# Patient Record
Sex: MALE | Race: OTHER | NOT HISPANIC OR LATINO | Employment: OTHER | URBAN - METROPOLITAN AREA
[De-identification: names, ages, dates, MRNs, and addresses within clinical notes are randomized per-mention and may not be internally consistent; named-entity substitution may affect disease eponyms.]

---

## 2023-07-19 LAB
ALANINE AMINOTRANSFERASE (SGPT) (U/L) IN SER/PLAS: 10 U/L (ref 10–52)
ALBUMIN (G/DL) IN SER/PLAS: 4.5 G/DL (ref 3.4–5)
ALKALINE PHOSPHATASE (U/L) IN SER/PLAS: 58 U/L (ref 33–136)
ANION GAP IN SER/PLAS: 13 MMOL/L (ref 10–20)
ASPARTATE AMINOTRANSFERASE (SGOT) (U/L) IN SER/PLAS: 15 U/L (ref 9–39)
BILIRUBIN TOTAL (MG/DL) IN SER/PLAS: 0.6 MG/DL (ref 0–1.2)
CALCIUM (MG/DL) IN SER/PLAS: 9.5 MG/DL (ref 8.6–10.3)
CARBON DIOXIDE, TOTAL (MMOL/L) IN SER/PLAS: 28 MMOL/L (ref 21–32)
CHLORIDE (MMOL/L) IN SER/PLAS: 103 MMOL/L (ref 98–107)
CREATININE (MG/DL) IN SER/PLAS: 1.54 MG/DL (ref 0.5–1.3)
GFR MALE: 46 ML/MIN/1.73M2
GLUCOSE (MG/DL) IN SER/PLAS: 96 MG/DL (ref 74–99)
MAGNESIUM (MG/DL) IN SER/PLAS: 2.44 MG/DL (ref 1.6–2.4)
NATRIURETIC PEPTIDE B (PG/ML) IN SER/PLAS: 170 PG/ML (ref 0–99)
POTASSIUM (MMOL/L) IN SER/PLAS: 4.2 MMOL/L (ref 3.5–5.3)
PROTEIN TOTAL: 7.5 G/DL (ref 6.4–8.2)
SODIUM (MMOL/L) IN SER/PLAS: 140 MMOL/L (ref 136–145)
UREA NITROGEN (MG/DL) IN SER/PLAS: 25 MG/DL (ref 6–23)

## 2023-10-13 PROBLEM — I42.0 ISCHEMIC DILATED CARDIOMYOPATHY (MULTI): Status: ACTIVE | Noted: 2023-10-13

## 2023-10-13 PROBLEM — E78.5 DYSLIPIDEMIA: Status: ACTIVE | Noted: 2023-10-13

## 2023-10-13 PROBLEM — I25.10 CAD (CORONARY ARTERY DISEASE): Status: ACTIVE | Noted: 2023-10-13

## 2023-10-13 PROBLEM — Z95.810 ICD (IMPLANTABLE CARDIOVERTER-DEFIBRILLATOR) IN PLACE: Status: ACTIVE | Noted: 2023-10-13

## 2023-10-13 PROBLEM — I50.22 CHRONIC SYSTOLIC HEART FAILURE (MULTI): Status: ACTIVE | Noted: 2023-10-13

## 2023-10-13 PROBLEM — R55 NEAR SYNCOPE: Status: ACTIVE | Noted: 2023-10-13

## 2023-10-13 PROBLEM — I25.5 ISCHEMIC DILATED CARDIOMYOPATHY (MULTI): Status: ACTIVE | Noted: 2023-10-13

## 2023-10-13 PROBLEM — I77.819 AORTIC ECTASIA (CMS-HCC): Status: ACTIVE | Noted: 2023-10-13

## 2023-10-13 RX ORDER — LISINOPRIL 2.5 MG/1
TABLET ORAL
COMMUNITY
Start: 2023-07-21

## 2023-10-13 RX ORDER — ATORVASTATIN CALCIUM 80 MG/1
TABLET, FILM COATED ORAL
COMMUNITY
Start: 2023-07-21

## 2023-10-13 RX ORDER — SPIRONOLACTONE 25 MG/1
TABLET ORAL
COMMUNITY
Start: 2023-07-21

## 2023-10-13 RX ORDER — CARVEDILOL 3.12 MG/1
TABLET ORAL
COMMUNITY
Start: 2023-07-21

## 2023-10-13 RX ORDER — CLOPIDOGREL BISULFATE 75 MG/1
TABLET ORAL
COMMUNITY
Start: 2023-07-21

## 2023-10-13 RX ORDER — EMPAGLIFLOZIN 10 MG/1
TABLET, FILM COATED ORAL
COMMUNITY
Start: 2023-07-20 | End: 2023-12-07 | Stop reason: ALTCHOICE

## 2023-10-13 RX ORDER — ATORVASTATIN CALCIUM 20 MG/1
TABLET, FILM COATED ORAL
COMMUNITY
Start: 2019-02-05 | End: 2023-12-07 | Stop reason: ALTCHOICE

## 2023-10-26 ENCOUNTER — APPOINTMENT (OUTPATIENT)
Dept: CARDIOLOGY | Facility: CLINIC | Age: 78
End: 2023-10-26
Payer: MEDICARE

## 2023-11-14 ENCOUNTER — APPOINTMENT (OUTPATIENT)
Dept: CARDIOLOGY | Facility: CLINIC | Age: 78
End: 2023-11-14
Payer: MEDICARE

## 2023-12-06 PROBLEM — Z95.5 S/P CORONARY ARTERY STENT PLACEMENT: Status: ACTIVE | Noted: 2023-12-06

## 2023-12-07 ENCOUNTER — OFFICE VISIT (OUTPATIENT)
Dept: CARDIOLOGY | Facility: CLINIC | Age: 78
End: 2023-12-07
Payer: MEDICARE

## 2023-12-07 VITALS
HEART RATE: 86 BPM | SYSTOLIC BLOOD PRESSURE: 107 MMHG | WEIGHT: 178 LBS | HEIGHT: 68 IN | BODY MASS INDEX: 26.98 KG/M2 | OXYGEN SATURATION: 98 % | DIASTOLIC BLOOD PRESSURE: 63 MMHG

## 2023-12-07 DIAGNOSIS — I50.22 CHRONIC SYSTOLIC HEART FAILURE (MULTI): ICD-10-CM

## 2023-12-07 DIAGNOSIS — Z95.810 ICD (IMPLANTABLE CARDIOVERTER-DEFIBRILLATOR) IN PLACE: Primary | ICD-10-CM

## 2023-12-07 PROCEDURE — 1036F TOBACCO NON-USER: CPT | Performed by: STUDENT IN AN ORGANIZED HEALTH CARE EDUCATION/TRAINING PROGRAM

## 2023-12-07 PROCEDURE — 93000 ELECTROCARDIOGRAM COMPLETE: CPT | Performed by: STUDENT IN AN ORGANIZED HEALTH CARE EDUCATION/TRAINING PROGRAM

## 2023-12-07 PROCEDURE — 1159F MED LIST DOCD IN RCRD: CPT | Performed by: STUDENT IN AN ORGANIZED HEALTH CARE EDUCATION/TRAINING PROGRAM

## 2023-12-07 PROCEDURE — 99204 OFFICE O/P NEW MOD 45 MIN: CPT | Performed by: STUDENT IN AN ORGANIZED HEALTH CARE EDUCATION/TRAINING PROGRAM

## 2023-12-07 ASSESSMENT — ENCOUNTER SYMPTOMS
OCCASIONAL FEELINGS OF UNSTEADINESS: 0
DEPRESSION: 0
LOSS OF SENSATION IN FEET: 0

## 2023-12-07 NOTE — PROGRESS NOTES
"Cardiac Electrophysiology Office Visit     Referred by Brayan Crow MD for   Chief Complaint   Patient presents with    Cardiomyopathy     HPI:  Haritha Aguirre is a 78 y.o. year old male patient with h/o CAD s/p PCI (mid LAD, proximal RCA June 2023, remote 2005), HLD, HFrEF/ICM s/p ICD (CRTD July 2022) presenting today for establish care    Patient present with his wife today reports has been doing okay.  Denies any episodes of chest pain, shortness of breath palpitations dizziness.  He reports has been taking all his medications recently.  His heart failure medications were recently adjusted by his primary cardiologist and patient was supposed to get a repeat echocardiogram however patient and his wife report that they forgot about the appointment.    Objective  No Known Allergies     Current Outpatient Medications   Medication Instructions    aspirin (ASPIR-81 ORAL) Aspirin 81 MG TABS   Refills: 0       Active    atorvastatin (Lipitor) 80 mg tablet     carvedilol (Coreg) 3.125 mg tablet     clopidogrel (Plavix) 75 mg tablet     lisinopril 2.5 mg tablet     spironolactone (Aldactone) 25 mg tablet          Visit Vitals  /63 (BP Location: Left arm, Patient Position: Sitting)   Pulse 86   Ht 1.727 m (5' 8\")   Wt 80.7 kg (178 lb)   SpO2 98%   BMI 27.06 kg/m²   Smoking Status Never   BSA 1.97 m²        Physical Exam  Constitutional:       Appearance: Normal appearance.   HENT:      Head: Normocephalic.   Cardiovascular:      Rate and Rhythm: Normal rate and regular rhythm.      Pulses: Normal pulses.      Heart sounds: No murmur heard.     Comments: left sided implant healed well, no ecchymosis, hematoma or drainage noted. Device protruding at the superior edge without signs of skin erosion  Pulmonary:      Effort: Pulmonary effort is normal. No respiratory distress.   Musculoskeletal:         General: No swelling.   Skin:     General: Skin is warm and dry.   Neurological:      Mental Status: He is alert. "   Psychiatric:         Mood and Affect: Mood normal.           My Interpretation of Reviewed Study(s):  Echo (June 2023): Severely decreased LV function with an EF of 20-25% normal biatrial size normal right ventricular size and function no significant valve pathology.  No pericardial effusion    Assessment/Plan   #HFrEF s/p CRT-D (BSC 2005 -> LV upgrade 2012 --> Gen Change 2022)  -Will move pt [place] device [facility] for follow up  -Pt missed some Echo appts for repeat Eval so will get Echo prior to his follow up with Dr. Adames  -No ICD shocks or therapies reported by the patient    Return to Clinic: Patient should return to the EP Clinic in 6 months    Brayan Pichardo MD [facility]  Cardiac Electrophysiology  [email or web address]    **Disclaimer: This note was dictated by speech recognition, and every effort has been made to prevent any error in transcription, however minor errors may be present**

## 2023-12-14 ENCOUNTER — APPOINTMENT (OUTPATIENT)
Dept: CARDIOLOGY | Facility: CLINIC | Age: 78
End: 2023-12-14
Payer: MEDICARE

## 2023-12-29 DIAGNOSIS — Z95.810 ICD (IMPLANTABLE CARDIOVERTER-DEFIBRILLATOR) IN PLACE: Primary | ICD-10-CM

## 2023-12-29 DIAGNOSIS — I42.9 CARDIOMYOPATHY, UNSPECIFIED TYPE (MULTI): ICD-10-CM

## 2024-01-12 ENCOUNTER — HOSPITAL ENCOUNTER (OUTPATIENT)
Dept: CARDIOLOGY | Facility: CLINIC | Age: 79
Discharge: HOME | End: 2024-01-12
Payer: MEDICARE

## 2024-01-12 DIAGNOSIS — I50.22 CHRONIC SYSTOLIC HEART FAILURE (MULTI): ICD-10-CM

## 2024-01-12 DIAGNOSIS — Z95.810 ICD (IMPLANTABLE CARDIOVERTER-DEFIBRILLATOR) IN PLACE: ICD-10-CM

## 2024-01-12 PROCEDURE — 93290 INTERROG DEV EVAL ICPMS IP: CPT | Performed by: NURSE PRACTITIONER

## 2024-01-12 PROCEDURE — 93284 PRGRMG EVAL IMPLANTABLE DFB: CPT

## 2024-01-12 PROCEDURE — 93284 PRGRMG EVAL IMPLANTABLE DFB: CPT | Performed by: NURSE PRACTITIONER

## 2024-02-20 PROCEDURE — 87651 STREP A DNA AMP PROBE: CPT

## 2024-02-21 ENCOUNTER — LAB REQUISITION (OUTPATIENT)
Dept: LAB | Facility: HOSPITAL | Age: 79
End: 2024-02-21
Payer: MEDICARE

## 2024-02-21 DIAGNOSIS — J02.9 ACUTE PHARYNGITIS, UNSPECIFIED: ICD-10-CM

## 2024-02-21 LAB — S PYO DNA THROAT QL NAA+PROBE: NOT DETECTED

## 2024-05-04 ENCOUNTER — APPOINTMENT (OUTPATIENT)
Dept: RADIOLOGY | Facility: HOSPITAL | Age: 79
End: 2024-05-04
Payer: MEDICARE

## 2024-05-04 ENCOUNTER — APPOINTMENT (OUTPATIENT)
Dept: CARDIOLOGY | Facility: HOSPITAL | Age: 79
End: 2024-05-04
Payer: MEDICARE

## 2024-05-04 ENCOUNTER — HOSPITAL ENCOUNTER (EMERGENCY)
Facility: HOSPITAL | Age: 79
Discharge: HOME | End: 2024-05-05
Attending: EMERGENCY MEDICINE
Payer: MEDICARE

## 2024-05-04 DIAGNOSIS — J40 BRONCHITIS: Primary | ICD-10-CM

## 2024-05-04 LAB
ALBUMIN SERPL BCP-MCNC: 3.9 G/DL (ref 3.4–5)
ALP SERPL-CCNC: 43 U/L (ref 33–136)
ALT SERPL W P-5'-P-CCNC: 12 U/L (ref 10–52)
ANION GAP SERPL CALC-SCNC: 11 MMOL/L (ref 10–20)
AST SERPL W P-5'-P-CCNC: 17 U/L (ref 9–39)
BASOPHILS # BLD AUTO: 0.01 X10*3/UL (ref 0–0.1)
BASOPHILS NFR BLD AUTO: 0.3 %
BILIRUB SERPL-MCNC: 0.6 MG/DL (ref 0–1.2)
BNP SERPL-MCNC: 156 PG/ML (ref 0–99)
BUN SERPL-MCNC: 19 MG/DL (ref 6–23)
CALCIUM SERPL-MCNC: 8.8 MG/DL (ref 8.6–10.3)
CARDIAC TROPONIN I PNL SERPL HS: 19 NG/L (ref 0–20)
CHLORIDE SERPL-SCNC: 102 MMOL/L (ref 98–107)
CO2 SERPL-SCNC: 27 MMOL/L (ref 21–32)
CREAT SERPL-MCNC: 1.16 MG/DL (ref 0.5–1.3)
EGFRCR SERPLBLD CKD-EPI 2021: 64 ML/MIN/1.73M*2
EOSINOPHIL # BLD AUTO: 0.1 X10*3/UL (ref 0–0.4)
EOSINOPHIL NFR BLD AUTO: 2.9 %
ERYTHROCYTE [DISTWIDTH] IN BLOOD BY AUTOMATED COUNT: 13.9 % (ref 11.5–14.5)
GLUCOSE SERPL-MCNC: 95 MG/DL (ref 74–99)
HCT VFR BLD AUTO: 33.5 % (ref 41–52)
HGB BLD-MCNC: 10.8 G/DL (ref 13.5–17.5)
IMM GRANULOCYTES # BLD AUTO: 0.01 X10*3/UL (ref 0–0.5)
IMM GRANULOCYTES NFR BLD AUTO: 0.3 % (ref 0–0.9)
INR PPP: 1.3 (ref 0.9–1.1)
LYMPHOCYTES # BLD AUTO: 0.79 X10*3/UL (ref 0.8–3)
LYMPHOCYTES NFR BLD AUTO: 23.1 %
MAGNESIUM SERPL-MCNC: 2.16 MG/DL (ref 1.6–2.4)
MCH RBC QN AUTO: 30.5 PG (ref 26–34)
MCHC RBC AUTO-ENTMCNC: 32.2 G/DL (ref 32–36)
MCV RBC AUTO: 95 FL (ref 80–100)
MONOCYTES # BLD AUTO: 0.48 X10*3/UL (ref 0.05–0.8)
MONOCYTES NFR BLD AUTO: 14 %
NEUTROPHILS # BLD AUTO: 2.03 X10*3/UL (ref 1.6–5.5)
NEUTROPHILS NFR BLD AUTO: 59.4 %
NRBC BLD-RTO: 0 /100 WBCS (ref 0–0)
PLATELET # BLD AUTO: 158 X10*3/UL (ref 150–450)
POTASSIUM SERPL-SCNC: 3.8 MMOL/L (ref 3.5–5.3)
PROT SERPL-MCNC: 6.9 G/DL (ref 6.4–8.2)
PROTHROMBIN TIME: 14.5 SECONDS (ref 9.8–12.8)
RBC # BLD AUTO: 3.54 X10*6/UL (ref 4.5–5.9)
SARS-COV-2 RNA RESP QL NAA+PROBE: NOT DETECTED
SODIUM SERPL-SCNC: 136 MMOL/L (ref 136–145)
WBC # BLD AUTO: 3.4 X10*3/UL (ref 4.4–11.3)

## 2024-05-04 PROCEDURE — 83880 ASSAY OF NATRIURETIC PEPTIDE: CPT | Performed by: STUDENT IN AN ORGANIZED HEALTH CARE EDUCATION/TRAINING PROGRAM

## 2024-05-04 PROCEDURE — 80053 COMPREHEN METABOLIC PANEL: CPT | Performed by: STUDENT IN AN ORGANIZED HEALTH CARE EDUCATION/TRAINING PROGRAM

## 2024-05-04 PROCEDURE — 2500000004 HC RX 250 GENERAL PHARMACY W/ HCPCS (ALT 636 FOR OP/ED): Performed by: STUDENT IN AN ORGANIZED HEALTH CARE EDUCATION/TRAINING PROGRAM

## 2024-05-04 PROCEDURE — 93005 ELECTROCARDIOGRAM TRACING: CPT

## 2024-05-04 PROCEDURE — 85610 PROTHROMBIN TIME: CPT | Performed by: STUDENT IN AN ORGANIZED HEALTH CARE EDUCATION/TRAINING PROGRAM

## 2024-05-04 PROCEDURE — 36415 COLL VENOUS BLD VENIPUNCTURE: CPT | Performed by: STUDENT IN AN ORGANIZED HEALTH CARE EDUCATION/TRAINING PROGRAM

## 2024-05-04 PROCEDURE — 2500000002 HC RX 250 W HCPCS SELF ADMINISTERED DRUGS (ALT 637 FOR MEDICARE OP, ALT 636 FOR OP/ED): Performed by: STUDENT IN AN ORGANIZED HEALTH CARE EDUCATION/TRAINING PROGRAM

## 2024-05-04 PROCEDURE — 84132 ASSAY OF SERUM POTASSIUM: CPT | Mod: 59,91 | Performed by: EMERGENCY MEDICINE

## 2024-05-04 PROCEDURE — 85025 COMPLETE CBC W/AUTO DIFF WBC: CPT | Performed by: STUDENT IN AN ORGANIZED HEALTH CARE EDUCATION/TRAINING PROGRAM

## 2024-05-04 PROCEDURE — 94667 MNPJ CHEST WALL 1ST: CPT

## 2024-05-04 PROCEDURE — 36415 COLL VENOUS BLD VENIPUNCTURE: CPT | Performed by: EMERGENCY MEDICINE

## 2024-05-04 PROCEDURE — 99285 EMERGENCY DEPT VISIT HI MDM: CPT | Performed by: EMERGENCY MEDICINE

## 2024-05-04 PROCEDURE — 87635 SARS-COV-2 COVID-19 AMP PRB: CPT | Performed by: STUDENT IN AN ORGANIZED HEALTH CARE EDUCATION/TRAINING PROGRAM

## 2024-05-04 PROCEDURE — 71045 X-RAY EXAM CHEST 1 VIEW: CPT | Performed by: SURGERY

## 2024-05-04 PROCEDURE — 87635 SARS-COV-2 COVID-19 AMP PRB: CPT | Performed by: EMERGENCY MEDICINE

## 2024-05-04 PROCEDURE — 94640 AIRWAY INHALATION TREATMENT: CPT

## 2024-05-04 PROCEDURE — 71045 X-RAY EXAM CHEST 1 VIEW: CPT

## 2024-05-04 PROCEDURE — 83735 ASSAY OF MAGNESIUM: CPT | Performed by: STUDENT IN AN ORGANIZED HEALTH CARE EDUCATION/TRAINING PROGRAM

## 2024-05-04 PROCEDURE — 84484 ASSAY OF TROPONIN QUANT: CPT | Performed by: STUDENT IN AN ORGANIZED HEALTH CARE EDUCATION/TRAINING PROGRAM

## 2024-05-04 PROCEDURE — 96374 THER/PROPH/DIAG INJ IV PUSH: CPT

## 2024-05-04 PROCEDURE — 99284 EMERGENCY DEPT VISIT MOD MDM: CPT | Mod: 25

## 2024-05-04 RX ORDER — IPRATROPIUM BROMIDE AND ALBUTEROL SULFATE 2.5; .5 MG/3ML; MG/3ML
9 SOLUTION RESPIRATORY (INHALATION) ONCE
Status: COMPLETED | OUTPATIENT
Start: 2024-05-04 | End: 2024-05-04

## 2024-05-04 RX ADMIN — METHYLPREDNISOLONE SODIUM SUCCINATE 125 MG: 125 INJECTION, POWDER, FOR SOLUTION INTRAMUSCULAR; INTRAVENOUS at 23:04

## 2024-05-04 RX ADMIN — IPRATROPIUM BROMIDE AND ALBUTEROL SULFATE 9 ML: 2.5; .5 SOLUTION RESPIRATORY (INHALATION) at 23:09

## 2024-05-04 ASSESSMENT — PAIN SCALES - GENERAL: PAINLEVEL_OUTOF10: 0 - NO PAIN

## 2024-05-04 ASSESSMENT — COLUMBIA-SUICIDE SEVERITY RATING SCALE - C-SSRS
1. IN THE PAST MONTH, HAVE YOU WISHED YOU WERE DEAD OR WISHED YOU COULD GO TO SLEEP AND NOT WAKE UP?: NO
2. HAVE YOU ACTUALLY HAD ANY THOUGHTS OF KILLING YOURSELF?: NO
6. HAVE YOU EVER DONE ANYTHING, STARTED TO DO ANYTHING, OR PREPARED TO DO ANYTHING TO END YOUR LIFE?: NO

## 2024-05-04 ASSESSMENT — LIFESTYLE VARIABLES
TOTAL SCORE: 0
EVER HAD A DRINK FIRST THING IN THE MORNING TO STEADY YOUR NERVES TO GET RID OF A HANGOVER: NO
HAVE PEOPLE ANNOYED YOU BY CRITICIZING YOUR DRINKING: NO
EVER FELT BAD OR GUILTY ABOUT YOUR DRINKING: NO
HAVE YOU EVER FELT YOU SHOULD CUT DOWN ON YOUR DRINKING: NO

## 2024-05-04 ASSESSMENT — PAIN - FUNCTIONAL ASSESSMENT: PAIN_FUNCTIONAL_ASSESSMENT: 0-10

## 2024-05-05 VITALS
BODY MASS INDEX: 26.66 KG/M2 | RESPIRATION RATE: 16 BRPM | TEMPERATURE: 97.2 F | WEIGHT: 180 LBS | HEART RATE: 73 BPM | HEIGHT: 69 IN | OXYGEN SATURATION: 95 % | SYSTOLIC BLOOD PRESSURE: 146 MMHG | DIASTOLIC BLOOD PRESSURE: 69 MMHG

## 2024-05-05 LAB
ANION GAP BLDV CALCULATED.4IONS-SCNC: 10 MMOL/L (ref 10–25)
BASE EXCESS BLDV CALC-SCNC: 2.3 MMOL/L (ref -2–3)
BODY TEMPERATURE: ABNORMAL
CA-I BLDV-SCNC: 1.15 MMOL/L (ref 1.1–1.33)
CARDIAC TROPONIN I PNL SERPL HS: 18 NG/L (ref 0–20)
CHLORIDE BLDV-SCNC: 102 MMOL/L (ref 98–107)
GLUCOSE BLDV-MCNC: 96 MG/DL (ref 74–99)
HCO3 BLDV-SCNC: 27.3 MMOL/L (ref 22–26)
HCT VFR BLD EST: 35 % (ref 41–52)
HGB BLDV-MCNC: 11.6 G/DL (ref 13.5–17.5)
INHALED O2 CONCENTRATION: 0 %
LACTATE BLDV-SCNC: 1 MMOL/L (ref 0.4–2)
OXYHGB MFR BLDV: 44.3 % (ref 45–75)
PCO2 BLDV: 43 MM HG (ref 41–51)
PH BLDV: 7.41 PH (ref 7.33–7.43)
PO2 BLDV: 31 MM HG (ref 35–45)
POTASSIUM BLDV-SCNC: 4 MMOL/L (ref 3.5–5.3)
SAO2 % BLDV: 45 % (ref 45–75)
SODIUM BLDV-SCNC: 135 MMOL/L (ref 136–145)

## 2024-05-05 RX ORDER — METHYLPREDNISOLONE 4 MG/1
TABLET ORAL
Qty: 21 TABLET | Refills: 0 | Status: SHIPPED | OUTPATIENT
Start: 2024-05-05 | End: 2024-05-12

## 2024-05-05 RX ORDER — ALBUTEROL SULFATE 90 UG/1
2 AEROSOL, METERED RESPIRATORY (INHALATION) EVERY 4 HOURS PRN
Qty: 18 G | Refills: 0 | Status: SHIPPED | OUTPATIENT
Start: 2024-05-05 | End: 2024-06-04

## 2024-05-05 ASSESSMENT — PAIN SCALES - GENERAL: PAINLEVEL_OUTOF10: 0 - NO PAIN

## 2024-05-05 NOTE — ED PROVIDER NOTES
HPI   Chief Complaint   Patient presents with    Cough       79-year-old male patient history of CAD and cardiomyopathy presents to the ED for 1 week of cough with yellow sputum and dyspnea on exertion, no orthopnea or PND.  Was seen by PCP, 3 days ago, blood work was performed, was supposed to get x-ray but does did not get it done                          Keo Coma Scale Score: 15                     Patient History   History reviewed. No pertinent past medical history.  Past Surgical History:   Procedure Laterality Date    OTHER SURGICAL HISTORY  02/05/2019    Cardioverter defibrillator insertion     No family history on file.  Social History     Tobacco Use    Smoking status: Never    Smokeless tobacco: Never   Substance Use Topics    Alcohol use: Never    Drug use: Never       Physical Exam   ED Triage Vitals [05/04/24 2030]   Temperature Heart Rate Respirations BP   36 °C (96.8 °F) 79 20 125/67      Pulse Ox Temp Source Heart Rate Source Patient Position   96 % Temporal Monitor --      BP Location FiO2 (%)     -- --       Physical Exam  Constitutional:       Appearance: Normal appearance.   HENT:      Head: Normocephalic and atraumatic.      Mouth/Throat:      Mouth: Mucous membranes are moist.   Eyes:      Extraocular Movements: Extraocular movements intact.   Cardiovascular:      Rate and Rhythm: Normal rate and regular rhythm.      Heart sounds: Normal heart sounds. No murmur heard.  Pulmonary:      Effort: Pulmonary effort is normal. No respiratory distress.      Breath sounds: Wheezing and rhonchi present.   Abdominal:      General: There is no distension.      Palpations: Abdomen is soft.      Tenderness: There is no abdominal tenderness. There is no guarding.   Musculoskeletal:      Right lower leg: No edema.      Left lower leg: No edema.   Skin:     General: Skin is warm and dry.   Neurological:      General: No focal deficit present.      Mental Status: He is alert and oriented to person, place,  and time.   Psychiatric:         Mood and Affect: Mood normal.         Behavior: Behavior normal.         ED Course & MDM   Diagnoses as of 05/05/24 0200   Bronchitis       Medical Decision Making      Procedure  Procedures     Michael Kothari DO  05/05/24 0200

## 2024-05-09 LAB
ATRIAL RATE: 71 BPM
P AXIS: 20 DEGREES
P OFFSET: 220 MS
P ONSET: 175 MS
PR INTERVAL: 104 MS
Q ONSET: 227 MS
QRS COUNT: 12 BEATS
QRS DURATION: 152 MS
QT INTERVAL: 460 MS
QTC CALCULATION(BAZETT): 499 MS
QTC FREDERICIA: 486 MS
R AXIS: 148 DEGREES
T AXIS: -4 DEGREES
T OFFSET: 457 MS
VENTRICULAR RATE: 71 BPM

## 2024-05-16 PROBLEM — R61 GENERALIZED HYPERHIDROSIS: Status: ACTIVE | Noted: 2023-07-23

## 2024-05-16 PROBLEM — I10 ESSENTIAL (PRIMARY) HYPERTENSION: Status: ACTIVE | Noted: 2023-06-28

## 2025-06-07 ENCOUNTER — HOSPITAL ENCOUNTER (EMERGENCY)
Facility: HOSPITAL | Age: 80
Discharge: HOME | End: 2025-06-07
Attending: EMERGENCY MEDICINE
Payer: MEDICARE

## 2025-06-07 ENCOUNTER — APPOINTMENT (OUTPATIENT)
Dept: CARDIOLOGY | Facility: HOSPITAL | Age: 80
End: 2025-06-07
Payer: MEDICARE

## 2025-06-07 ENCOUNTER — APPOINTMENT (OUTPATIENT)
Dept: RADIOLOGY | Facility: HOSPITAL | Age: 80
End: 2025-06-07
Payer: MEDICARE

## 2025-06-07 VITALS
TEMPERATURE: 97 F | BODY MASS INDEX: 27.28 KG/M2 | HEIGHT: 68 IN | RESPIRATION RATE: 20 BRPM | HEART RATE: 74 BPM | WEIGHT: 180 LBS | DIASTOLIC BLOOD PRESSURE: 77 MMHG | OXYGEN SATURATION: 96 % | SYSTOLIC BLOOD PRESSURE: 143 MMHG

## 2025-06-07 DIAGNOSIS — J01.90 SUBACUTE SINUSITIS, UNSPECIFIED LOCATION: Primary | ICD-10-CM

## 2025-06-07 LAB
ALBUMIN SERPL BCP-MCNC: 4.5 G/DL (ref 3.4–5)
ALP SERPL-CCNC: 59 U/L (ref 33–136)
ALT SERPL W P-5'-P-CCNC: 13 U/L (ref 10–52)
ANION GAP SERPL CALC-SCNC: 12 MMOL/L (ref 10–20)
AST SERPL W P-5'-P-CCNC: 20 U/L (ref 9–39)
BASOPHILS # BLD AUTO: 0.02 X10*3/UL (ref 0–0.1)
BASOPHILS NFR BLD AUTO: 0.4 %
BILIRUB SERPL-MCNC: 0.8 MG/DL (ref 0–1.2)
BNP SERPL-MCNC: 271 PG/ML (ref 0–99)
BUN SERPL-MCNC: 22 MG/DL (ref 6–23)
CALCIUM SERPL-MCNC: 9.2 MG/DL (ref 8.6–10.3)
CARDIAC TROPONIN I PNL SERPL HS: 21 NG/L (ref 0–20)
CARDIAC TROPONIN I PNL SERPL HS: 23 NG/L (ref 0–20)
CHLORIDE SERPL-SCNC: 101 MMOL/L (ref 98–107)
CO2 SERPL-SCNC: 27 MMOL/L (ref 21–32)
CREAT SERPL-MCNC: 1.25 MG/DL (ref 0.5–1.3)
EGFRCR SERPLBLD CKD-EPI 2021: 58 ML/MIN/1.73M*2
EOSINOPHIL # BLD AUTO: 0.09 X10*3/UL (ref 0–0.4)
EOSINOPHIL NFR BLD AUTO: 1.8 %
ERYTHROCYTE [DISTWIDTH] IN BLOOD BY AUTOMATED COUNT: 13.3 % (ref 11.5–14.5)
FLUAV RNA RESP QL NAA+PROBE: NOT DETECTED
FLUBV RNA RESP QL NAA+PROBE: NOT DETECTED
GLUCOSE SERPL-MCNC: 99 MG/DL (ref 74–99)
HCT VFR BLD AUTO: 40.9 % (ref 41–52)
HGB BLD-MCNC: 13.6 G/DL (ref 13.5–17.5)
IMM GRANULOCYTES # BLD AUTO: 0.01 X10*3/UL (ref 0–0.5)
IMM GRANULOCYTES NFR BLD AUTO: 0.2 % (ref 0–0.9)
LYMPHOCYTES # BLD AUTO: 0.5 X10*3/UL (ref 0.8–3)
LYMPHOCYTES NFR BLD AUTO: 9.8 %
MAGNESIUM SERPL-MCNC: 2.34 MG/DL (ref 1.6–2.4)
MCH RBC QN AUTO: 31.3 PG (ref 26–34)
MCHC RBC AUTO-ENTMCNC: 33.3 G/DL (ref 32–36)
MCV RBC AUTO: 94 FL (ref 80–100)
MONOCYTES # BLD AUTO: 0.46 X10*3/UL (ref 0.05–0.8)
MONOCYTES NFR BLD AUTO: 9 %
NEUTROPHILS # BLD AUTO: 4.01 X10*3/UL (ref 1.6–5.5)
NEUTROPHILS NFR BLD AUTO: 78.8 %
NRBC BLD-RTO: 0 /100 WBCS (ref 0–0)
PLATELET # BLD AUTO: 179 X10*3/UL (ref 150–450)
POTASSIUM SERPL-SCNC: 4.3 MMOL/L (ref 3.5–5.3)
PROT SERPL-MCNC: 7.6 G/DL (ref 6.4–8.2)
RBC # BLD AUTO: 4.34 X10*6/UL (ref 4.5–5.9)
RSV RNA RESP QL NAA+PROBE: NOT DETECTED
SARS-COV-2 RNA RESP QL NAA+PROBE: NOT DETECTED
SODIUM SERPL-SCNC: 136 MMOL/L (ref 136–145)
WBC # BLD AUTO: 5.1 X10*3/UL (ref 4.4–11.3)

## 2025-06-07 PROCEDURE — 36415 COLL VENOUS BLD VENIPUNCTURE: CPT

## 2025-06-07 PROCEDURE — 71046 X-RAY EXAM CHEST 2 VIEWS: CPT

## 2025-06-07 PROCEDURE — 83735 ASSAY OF MAGNESIUM: CPT

## 2025-06-07 PROCEDURE — 87637 SARSCOV2&INF A&B&RSV AMP PRB: CPT

## 2025-06-07 PROCEDURE — 99285 EMERGENCY DEPT VISIT HI MDM: CPT | Performed by: EMERGENCY MEDICINE

## 2025-06-07 PROCEDURE — 2500000001 HC RX 250 WO HCPCS SELF ADMINISTERED DRUGS (ALT 637 FOR MEDICARE OP)

## 2025-06-07 PROCEDURE — 84484 ASSAY OF TROPONIN QUANT: CPT

## 2025-06-07 PROCEDURE — 83880 ASSAY OF NATRIURETIC PEPTIDE: CPT

## 2025-06-07 PROCEDURE — 80053 COMPREHEN METABOLIC PANEL: CPT

## 2025-06-07 PROCEDURE — 93005 ELECTROCARDIOGRAM TRACING: CPT

## 2025-06-07 PROCEDURE — 85025 COMPLETE CBC W/AUTO DIFF WBC: CPT

## 2025-06-07 PROCEDURE — 99285 EMERGENCY DEPT VISIT HI MDM: CPT | Mod: 25 | Performed by: EMERGENCY MEDICINE

## 2025-06-07 PROCEDURE — 71046 X-RAY EXAM CHEST 2 VIEWS: CPT | Mod: FOREIGN READ | Performed by: RADIOLOGY

## 2025-06-07 RX ORDER — AMOXICILLIN AND CLAVULANATE POTASSIUM 875; 125 MG/1; MG/1
1 TABLET, FILM COATED ORAL ONCE
Status: COMPLETED | OUTPATIENT
Start: 2025-06-07 | End: 2025-06-07

## 2025-06-07 RX ORDER — AMOXICILLIN AND CLAVULANATE POTASSIUM 875; 125 MG/1; MG/1
1 TABLET, FILM COATED ORAL EVERY 12 HOURS
Qty: 14 TABLET | Refills: 0 | Status: SHIPPED | OUTPATIENT
Start: 2025-06-07 | End: 2025-06-14

## 2025-06-07 RX ADMIN — AMOXICILLIN AND CLAVULANATE POTASSIUM 1 TABLET: 875; 125 TABLET, FILM COATED ORAL at 09:13

## 2025-06-07 ASSESSMENT — LIFESTYLE VARIABLES
HAVE PEOPLE ANNOYED YOU BY CRITICIZING YOUR DRINKING: NO
EVER HAD A DRINK FIRST THING IN THE MORNING TO STEADY YOUR NERVES TO GET RID OF A HANGOVER: NO
HAVE YOU EVER FELT YOU SHOULD CUT DOWN ON YOUR DRINKING: NO
TOTAL SCORE: 0
EVER FELT BAD OR GUILTY ABOUT YOUR DRINKING: NO

## 2025-06-07 ASSESSMENT — COLUMBIA-SUICIDE SEVERITY RATING SCALE - C-SSRS
6. HAVE YOU EVER DONE ANYTHING, STARTED TO DO ANYTHING, OR PREPARED TO DO ANYTHING TO END YOUR LIFE?: NO
1. IN THE PAST MONTH, HAVE YOU WISHED YOU WERE DEAD OR WISHED YOU COULD GO TO SLEEP AND NOT WAKE UP?: NO
2. HAVE YOU ACTUALLY HAD ANY THOUGHTS OF KILLING YOURSELF?: NO

## 2025-06-07 ASSESSMENT — PAIN - FUNCTIONAL ASSESSMENT: PAIN_FUNCTIONAL_ASSESSMENT: 0-10

## 2025-06-07 ASSESSMENT — PAIN DESCRIPTION - LOCATION: LOCATION: HEAD

## 2025-06-07 ASSESSMENT — PAIN SCALES - GENERAL: PAINLEVEL_OUTOF10: 8

## 2025-06-07 ASSESSMENT — PAIN DESCRIPTION - PAIN TYPE: TYPE: ACUTE PAIN

## 2025-06-07 NOTE — ED PROVIDER NOTES
"Emergency Department Provider Note        History of Present Illness     History provided by: Patient and Significant Other  Limitations to History: None  External Records Reviewed with Brief Summary: Medical chart review    HPI:  Haritha Aguirre is a 80 y.o. male who arrives in accompaniment of his wife via private vehicle with a chief complaint of 3 weeks of coughing and sneezing.  Patient denies chest pain at this time, he does have a pacemaker which he states was placed due to \"weak heart\" and slow heart rate several years ago.  He denies any recent effects with his pacemaker, states he is not experiencing palpitations.  He does follow-up with a primary care provider, sees Dr. Adames of this facility as his cardiologist.  He states he is not on any daily medication at this time, does not have any allergies to medication, he denies any history of tobacco use, denies alcohol or illicit drug use.  States he has had subjective fevers with temperatures up to 99 °F, he is afebrile on arrival not tachycardic, saturating 98% on room air, speaking in full sentences.  He and his wife state their primary concern is that the cough is gone on for 3 weeks and they are concerned that he may have a pneumonia.  He denies recent sick contacts.    Physical Exam   Triage vitals:  T 36.1 °C (97 °F)  HR 67  /66  RR 16  O2 98 % None (Room air)    General: Awake, alert, in no acute distress  Eyes: Gaze conjugate.  No scleral icterus or injection  HENT: Normo-cephalic, atraumatic. No stridor  CV: Regular rate, regular rhythm. Radial pulses 2+ bilaterally  Resp: Breathing non-labored, speaking in full sentences.  Clear to auscultation bilaterally  GI: Soft, non-distended, non-tender. No rebound or guarding.  : Deferred  MSK/Extremities: No gross bony deformities. Moving all extremities  Skin: Warm. Appropriate color  Neuro: Alert. Oriented. Face symmetric. Speech is fluent.  Gross strength and sensation intact in b/l UE and " LEs  Psych: Appropriate mood and affect    Medical Decision Making & ED Course   Medical Decision Makin y.o. male who arrives with a 3-week history of cough, subjective fever, with history of pacemaker with no apparent chest pain or shortness of breath at this time, no history of CHF or other coronary problems other than bradycardia per the patient's report arrives with a chief complaint of 3 weeks of cough, subjective fever and sneezing.  Patient denies sick contacts.  Patient was swabbed for influenza and COVID, troponin and delta, CMP, BNP, chest x-ray to evaluate for pneumonia/cardiopulmonary processes, BNP to evaluate for signs of heart failure, chest x-ray to evaluate signs of infection ordered.  Ultimately no acute findings, mildly elevated BNP however there is no findings clinically of CHF, no pitting edema, no dyspnea on exertion and no abnormal breath sounds.  Ultimately patient was discharged home with diagnosis of sinusitis, instructed to follow-up with his primary care provider, patient understands plan of care.  Patient discharged home.  Please see ED course for further medical decision making----      Differential diagnoses considered include but are not limited to: Pneumonia, upper respiratory infection, viral infection, CHF     Social Determinants of Health which Significantly Impact Care: None identified     EKG Independent Interpretation: EKG interpreted by myself. Please see ED Course for full interpretation.    Independent Result Review and Interpretation: Relevant laboratory and radiographic results were reviewed and independently interpreted by myself.  As necessary, they are commented on in the ED Course.    Chronic conditions affecting the patient's care: As documented above in [facility]    The patient was discussed with the following consultants/services: None    Care Considerations: As documented above in [facility]    ED Course:  ED Course as of 25 1637   Sat 2025   0840 EKG shows  AV paced rhythm with ventricular rate of 71 bpm.  Right bundle branch block pattern present in V1.  No acute injury pattern is present. [JJ]   0909 Patient seen and examined.  80-year-old male presenting to the emergency department.  He has had 2 weeks of nasal congestion sinus pressure and cough.  Cough has been productive of yellow sputum.  He denies any significant shortness of breath.  He states that he has occasionally had some mild chest discomfort associated with the cough.  No chest pain currently.  He denies any hemoptysis.  No vomiting diarrhea blood in stool or black tarry stools.  No pain or swelling in the legs.    Patient is afebrile and hemodynamically stable.  No JVD.  Lungs are clear to auscultation.  Abdomen is soft and nontender.  No lower extremity edema is noted.    Given his chest discomfort and 2 weeks of cough we will perform cardiac evaluation.  However, his history and physical examination is most consistent with sinusitis.  Given that this is present for 2 weeks would start him on Augmentin at this point and have him follow-up outpatient as long as his cardiac evaluation is unremarkable. [JJ]   0956 XR chest 2 views  LUNGS:  Lungs are clear.     ABDOMEN:  No remarkable upper abdominal findings.     BONES:  No acute osseous changes.  IMPRESSION:  No acute findings.   [PV]   0956 Coronavirus 2019, PCR: Not Detected [PV]   0956 Flu A Result: Not Detected [PV]   0956 Flu B Result: Not Detected [PV]   0956 RSV PCR: Not Detected [PV]      ED Course User Index  [JJ] Heath Molina DO  [PV] Wiliam Mata DO         Diagnoses as of 06/07/25 1637   Subacute sinusitis, unspecified location     Disposition   As a result of the work-up, the patient was discharged home.  he was informed of his diagnosis and instructed to come back with any concerns or worsening of condition.  he and was agreeable to the plan as discussed above.  he was given the opportunity to ask questions.  All of the patient's  questions were answered.    Procedures   Procedures    Patient seen and discussed with ED attending physician.    Wiliam Mata DO  Emergency Medicine     Wiliam Mata DO  Resident  06/07/25 1640

## 2025-06-07 NOTE — DISCHARGE INSTRUCTIONS
Take the entire course of antibiotics as directed.    Follow-up with your primary care doctor.    Return to the emergency department if you have worsening chest pain, shortness of breath, lightheadedness, if you pass out or with any new or worsening symptoms.

## 2025-06-08 LAB
ATRIAL RATE: 71 BPM
P AXIS: 77 DEGREES
P OFFSET: 192 MS
P ONSET: 155 MS
PR INTERVAL: 102 MS
Q ONSET: 195 MS
QRS COUNT: 11 BEATS
QRS DURATION: 146 MS
QT INTERVAL: 456 MS
QTC CALCULATION(BAZETT): 495 MS
QTC FREDERICIA: 482 MS
R AXIS: -43 DEGREES
T AXIS: 112 DEGREES
T OFFSET: 423 MS
VENTRICULAR RATE: 71 BPM
